# Patient Record
Sex: MALE | Race: WHITE | URBAN - METROPOLITAN AREA
[De-identification: names, ages, dates, MRNs, and addresses within clinical notes are randomized per-mention and may not be internally consistent; named-entity substitution may affect disease eponyms.]

---

## 2021-07-09 ENCOUNTER — APPOINTMENT (OUTPATIENT)
Dept: GENERAL RADIOLOGY | Age: 51
End: 2021-07-09
Payer: COMMERCIAL

## 2021-07-09 ENCOUNTER — HOSPITAL ENCOUNTER (EMERGENCY)
Age: 51
Discharge: HOME | End: 2021-07-10
Attending: EMERGENCY MEDICINE
Payer: COMMERCIAL

## 2021-07-09 VITALS
RESPIRATION RATE: 16 BRPM | HEART RATE: 72 BPM | WEIGHT: 183.2 LBS | HEIGHT: 69 IN | TEMPERATURE: 98.7 F | BODY MASS INDEX: 27.13 KG/M2 | SYSTOLIC BLOOD PRESSURE: 139 MMHG | DIASTOLIC BLOOD PRESSURE: 89 MMHG | OXYGEN SATURATION: 99 %

## 2021-07-09 DIAGNOSIS — S61.313A LACERATION OF LEFT MIDDLE FINGER W/O FOREIGN BODY WITH DAMAGE TO NAIL, INITIAL ENCOUNTER: Primary | ICD-10-CM

## 2021-07-09 PROCEDURE — 6360000002 HC RX W HCPCS: Performed by: EMERGENCY MEDICINE

## 2021-07-09 PROCEDURE — 2500000003 HC RX 250 WO HCPCS: Performed by: EMERGENCY MEDICINE

## 2021-07-09 PROCEDURE — 90715 TDAP VACCINE 7 YRS/> IM: CPT | Performed by: EMERGENCY MEDICINE

## 2021-07-09 PROCEDURE — 12041 INTMD RPR N-HF/GENIT 2.5CM/<: CPT

## 2021-07-09 PROCEDURE — 73130 X-RAY EXAM OF HAND: CPT

## 2021-07-09 PROCEDURE — 99283 EMERGENCY DEPT VISIT LOW MDM: CPT

## 2021-07-09 PROCEDURE — 90471 IMMUNIZATION ADMIN: CPT | Performed by: EMERGENCY MEDICINE

## 2021-07-09 PROCEDURE — 6370000000 HC RX 637 (ALT 250 FOR IP): Performed by: EMERGENCY MEDICINE

## 2021-07-09 RX ORDER — CEPHALEXIN 500 MG/1
500 CAPSULE ORAL 4 TIMES DAILY
Qty: 20 CAPSULE | Refills: 0 | Status: SHIPPED | OUTPATIENT
Start: 2021-07-09 | End: 2021-07-14

## 2021-07-09 RX ORDER — CEPHALEXIN 500 MG/1
500 CAPSULE ORAL ONCE
Status: COMPLETED | OUTPATIENT
Start: 2021-07-09 | End: 2021-07-09

## 2021-07-09 RX ORDER — BUPIVACAINE HYDROCHLORIDE 5 MG/ML
30 INJECTION, SOLUTION EPIDURAL; INTRACAUDAL ONCE
Status: COMPLETED | OUTPATIENT
Start: 2021-07-09 | End: 2021-07-09

## 2021-07-09 RX ADMIN — TETANUS TOXOID, REDUCED DIPHTHERIA TOXOID AND ACELLULAR PERTUSSIS VACCINE, ADSORBED 0.5 ML: 5; 2.5; 8; 8; 2.5 SUSPENSION INTRAMUSCULAR at 23:45

## 2021-07-09 RX ADMIN — CEPHALEXIN 500 MG: 500 CAPSULE ORAL at 23:41

## 2021-07-09 RX ADMIN — BUPIVACAINE HYDROCHLORIDE 150 MG: 5 INJECTION, SOLUTION EPIDURAL; INTRACAUDAL; PERINEURAL at 23:30

## 2021-07-09 ASSESSMENT — PAIN DESCRIPTION - LOCATION
LOCATION: FINGER (COMMENT WHICH ONE)
LOCATION: FINGER (COMMENT WHICH ONE)

## 2021-07-09 ASSESSMENT — PAIN DESCRIPTION - ORIENTATION
ORIENTATION: LEFT
ORIENTATION: LEFT

## 2021-07-09 ASSESSMENT — PAIN DESCRIPTION - ONSET
ONSET: ON-GOING
ONSET: SUDDEN

## 2021-07-09 ASSESSMENT — PAIN SCALES - GENERAL
PAINLEVEL_OUTOF10: 10
PAINLEVEL_OUTOF10: 8
PAINLEVEL_OUTOF10: 8

## 2021-07-09 ASSESSMENT — PAIN - FUNCTIONAL ASSESSMENT
PAIN_FUNCTIONAL_ASSESSMENT: PREVENTS OR INTERFERES SOME ACTIVE ACTIVITIES AND ADLS
PAIN_FUNCTIONAL_ASSESSMENT: PREVENTS OR INTERFERES SOME ACTIVE ACTIVITIES AND ADLS

## 2021-07-09 ASSESSMENT — PAIN DESCRIPTION - DESCRIPTORS
DESCRIPTORS: THROBBING
DESCRIPTORS: THROBBING

## 2021-07-09 ASSESSMENT — PAIN DESCRIPTION - PAIN TYPE
TYPE: ACUTE PAIN
TYPE: ACUTE PAIN

## 2021-07-09 ASSESSMENT — PAIN DESCRIPTION - PROGRESSION: CLINICAL_PROGRESSION: NOT CHANGED

## 2021-07-10 NOTE — ED PROVIDER NOTES
[facility] ENCOUNTER      Pt Name: Matilde Gruber  MRN: [id number]  [place] 1970  Date of evaluation: 7/9/2021  Provider: Naomy Pollard DO    CHIEF COMPLAINT       Chief Complaint   Patient presents with    Hand Laceration     Pt injured LIF while cutting grass. Pt has lost  3/4 ths of his nail and has 2 to 3 mm lacerations on either side of  his nail bed. Bleeding is controlled at present. Pt is able to bend finger at all joints. HISTORY OF PRESENT ILLNESS   (Location/Symptom, Timing/Onset, Context/Setting, Quality, Duration, Modifying Factors, Severity)  Note limiting factors. Matilde Gruber is a 48 y.o. male who presents to the emergency department with a complaint of an injury to the left index finger that he sustained when he was cutting grass. He reports that he left the mower running while he was adjusting the wheel and reached under the mower to hold onto it and the tip of the blade cut the tip of his left index finger and pulled off the nail. He denies any other injury. He is not diabetic. He does not take any anticoagulants. Injury occurred prior to arrival.    Nursing Notes were reviewed. HPI        REVIEW OF SYSTEMS    (2-9 systems for level 4, 10 or more for level 5)       Constitutional: Negative for fever or chills. Respiratory: Negative for shortness of breath or dyspnea on exertion. Cardiovascular: Negative for chest pain. Gastrointestinal: Negative for abdominal pain. Negative for vomiting or diarrhea. Genitourinary: Negative for flank pain. Negative for dysuria. Negative for hematuria. Neurological: Negative for headache. All systems are reviewed and are negative except for those listed above in the history of present illness and ROS. PAST MEDICAL HISTORY     Past Medical History:   Diagnosis Date    ADHD     Hyperlipidemia          SURGICAL HISTORY     History reviewed.  No pertinent surgical history. CURRENT MEDICATIONS       Previous Medications    AMPHETAMINE-DEXTROAMPHETAMINE (ADDERALL, 20MG,) 20 MG TABLET    Take 20 mg by mouth daily    OMEGA 3-6-9 FATTY ACIDS (OMEGA 3-6-9 COMPLEX PO)    Take by mouth    TADALAFIL (CIALIS) 5 MG TABLET    Take 5 mg by mouth as needed for Erectile Dysfunction       ALLERGIES     Patient has no known allergies. FAMILY HISTORY     History reviewed. No pertinent family history. SOCIAL HISTORY       Social History     Socioeconomic History    Marital status: Single     Spouse name: None    Number of children: None    Years of education: None    Highest education level: None   Occupational History    None   Tobacco Use    Smoking status: Light Tobacco Smoker    Smokeless tobacco: Never Used   Substance and Sexual Activity    Alcohol use: Yes     Comment: socially    Drug use: Yes     Types: Marijuana    Sexual activity: None   Other Topics Concern    None   Social History Narrative    None     Social Determinants of Health     Financial Resource Strain:     Difficulty of Paying Living Expenses:    Food Insecurity:     Worried About Running Out of Food in the Last Year:     Ran Out of Food in the Last Year:    Transportation Needs:     Lack of Transportation (Medical):      Lack of Transportation (Non-Medical):    Physical Activity:     Days of Exercise per Week:     Minutes of Exercise per Session:    Stress:     Feeling of Stress :    Social Connections:     Frequency of Communication with Friends and Family:     Frequency of Social Gatherings with Friends and Family:     Attends Ca[employer] Services:     Active Member of Clubs or Organizations:     Attends Club or Organization Meetings:     Marital Status:    Intimate Partner Violence:     Fear of Current or Ex-Partner:     Emotionally Abused:     Physically Abused:     Sexually Abused:        SCREENINGS             PHYSICAL EXAM    (up to 7 for level 4, 8 or more for level 5)     ED Triage Vitals [07/09/21 2130]   BP Temp Temp Source Pulse Resp SpO2 Height Weight   139/89 98.7 °F (37.1 °C) Oral 72 16 99 % 5' 9\" (1.753 m) 183 lb 3.2 oz (83.1 kg)         Physical Exam   Constitutional: Awake and alert. Mild discomfort. Head: No visible evidence of trauma. Normocephalic. Heart:  Regular rate and rhythm. Lungs: No conversational dyspnea or accessory muscle use. Musculoskeletal: Extremities non-tender with full range of motion. However, there was an injury to the tip of the left index finger. Approximately 75% of the nail was avulsed from the nail bed. There was a transverse laceration across the distal one third of the nailbed. No visible tuft exposed. No active bleeding. No foreign body. No deformity. The patient was able to flex and extend the finger against resistance at all joint levels. Sensation was intact to the tip of the finger. The nail envelope and proximal nail were well seated and appeared to be unaffected. Capillary refill less than 2 seconds to the tip of the finger. In addition there is a small abrasion to the tip of the left index finger as well as a small hemorrhagic vesicle to the tip of the left index finger and left fourth finger on the tip of the volar pad. All remaining nailbeds were intact. Capillary refill less than 2 seconds in all digits. No other bony tenderness to the hand. Radial and dorsalis pedis pulses were intact. No calf tenderness erythema or edema. Neurological: Alert and oriented x 3. Speech clear. DO median and ulnar nerve are fully intact. No facial droop. No acute focal motor or sensory deficits. Skin: Skin is warm and dry. No rash. Psychiatric: Normal mood and affect.  Behavior is normal.         DIAGNOSTIC RESULTS     EKG: All EKG's are interpreted by the Emergency Department Physician who either signs or Co-signs this chart in the absence of a cardiologist.        RADIOLOGY:   Non-plain film images such as CT, Ultrasound and MRI are read by the radiologist. Plain radiographic images are visualized and preliminarily interpreted by the emergency physician with the below findings:        Interpretation per the Radiologist below, if available at the time of this note:    XR HAND LEFT (MIN 3 VIEWS)    (Results Pending)         ED BEDSIDE ULTRASOUND:   Performed by ED Physician - none    LABS:  Labs Reviewed - No data to display    All other labs were within normal range or not returned as of this dictation. EMERGENCY DEPARTMENT COURSE and DIFFERENTIAL DIAGNOSIS/MDM:   Vitals:    Vitals:    07/09/21 2130   BP: 139/89   Pulse: 72   Resp: 16   Temp: 98.7 °F (37.1 °C)   TempSrc: Oral   SpO2: 99%   Weight: 183 lb 3.2 oz (83.1 kg)   Height: 5' 9\" (1.753 m)         MDM      Patient presents with a laceration/nailbed injury to the left middle finger as noted above. X-ray of the left hand was obtained. Tetanus immunization was updated. Wound care was provided. He was given Keflex 500 mg p.o. Digital block was completed of the left third finger and laceration was repaired. X-ray was reviewed. There does appear to be a small avulsion fracture to the tip of the distal phalanx of the left middle finger. There is a small adjacent tiny bony fragment. No other bony injury identified. REASSESSMENT          I advised him to follow-up with Dr. Raquel Alejandra, hand surgeon in 1 to 2 days for reexamination. Call tomorrow morning for an appointment to be seen on Monday. Leave dressing in place until seen by the hand surgeon on Monday. If unable to see the hand surgeon remove the dressing and clean with warm soapy water twice daily. Keep covered. Wear finger splint for protection. Recommend suture removal in 7 to 10 days. He will be given a prescription for Keflex. If condition worsens or new symptoms develop, return immediately to the emergency department.   Do not submerge the hand underwater, swim, etc. until cleared by the hand surgeon. CRITICAL CARE TIME   Total Critical Care time was 0 minutes, excluding separately reportable procedures. There was a high probability of clinically significant/life threatening deterioration in the patient's condition which required my urgent intervention. CONSULTS:  None    PROCEDURES:  Unless otherwise noted below, none     Procedures    Left third finger nailbed laceration repair:    Digital block was completed with 0.5% bupivacaine, 4 mL subcutaneously. After adequate anesthesia, wounds were cleansed with chlorhexidine and irrigated with normal saline. Wound was explored. No foreign body. No active bleeding. No visible tuft. There is a transverse 1.5 cm laceration to the distal one third of the nailbed. A single 5-0 nylon simple interrupted suture was placed on each side of the nailbed at the cuticle margin for stability. The nailbed laceration was approximated with a total of 3, 5-0 Vicryl sutures. It should be noted that the laceration of the nailbed was more of an avulsion and there was some missing tissue. This was approximated as best as possible. Patient was advised of the potential for future nail deformity, lack of growth of the nail, complete loss of the nail. Proximal nail was well seated in position and did not require removal.  Distal nail was avulsed, approximately 75% of the nail. Sterile dressing was applied. Finger splint was applied. FINAL IMPRESSION      1. Laceration of left middle finger w/o foreign body with damage to nail, initial encounter          DISPOSITION/PLAN   DISPOSITION        PATIENT REFERRED TO:  Chavez Wellington MD  [address] [place] [address]  [phone]    Call today        DISCHARGE MEDICATIONS:  New Prescriptions    CEPHALEXIN (KEFLEX) 500 MG CAPSULE    Take 1 capsule by mouth 4 times daily for 5 days     Controlled Substances Monitoring:     No flowsheet data found.     (Please note that portions of this note were completed with a voice recognition program.  Efforts were made to edit the dictations but occasionally words are mis-transcribed. )    Wade Fry DO (electronically signed)  Attending Emergency Physician          Jolie Severe, DO  07/09/21 1353

## 2021-07-12 ENCOUNTER — TELEPHONE (OUTPATIENT)
Dept: ORTHOPEDIC SURGERY | Age: 51
End: 2021-07-12

## 2021-07-12 NOTE — TELEPHONE ENCOUNTER
Appointment Request     Patient requesting earlier appointment: Yes  Appointment offered to patient: KO[place] ON 7/22 [address] @[facility] 7/9  Patient Contact Number: [phone]

## 2021-07-15 ENCOUNTER — OFFICE VISIT (OUTPATIENT)
Dept: ORTHOPEDIC SURGERY | Age: 51
End: 2021-07-15
Payer: COMMERCIAL

## 2021-07-15 VITALS — BODY MASS INDEX: 26.66 KG/M2 | HEIGHT: 69 IN | WEIGHT: 180 LBS | RESPIRATION RATE: 16 BRPM

## 2021-07-15 DIAGNOSIS — S62.663B OPEN NONDISPLACED FRACTURE OF DISTAL PHALANX OF LEFT MIDDLE FINGER, INITIAL ENCOUNTER: Primary | ICD-10-CM

## 2021-07-15 DIAGNOSIS — S61.319A NAILBED LACERATION, FINGER, INITIAL ENCOUNTER: ICD-10-CM

## 2021-07-15 PROCEDURE — 26750 TREAT FINGER FRACTURE EACH: CPT | Performed by: ORTHOPAEDIC SURGERY

## 2021-07-15 PROCEDURE — 99203 OFFICE O/P NEW LOW 30 MIN: CPT | Performed by: ORTHOPAEDIC SURGERY

## 2021-07-15 NOTE — PROGRESS NOTES
This 48 y.o. right hand dominant  is seen in referral for the ED at [place] [address] with a chief complaint of an injury to the left middle finger. The injury occurred 6 days ago when the patient came in contact with a mower blade. He noticed pain, bleeding. The patient was evaluated at the Clinic where the skin and nailbed wound was sutured. A lacerated tendon was not suspected. A lacerated nerve was not suspected. A foreign body was not found. Xrays showed a fracture of the distal phalanx. The finger was dressed, splinted, antibiotics were prescribed tetanus prophylaxis was updated. The patient was sent for hand surgery consultation. The pain assessment has been reviewed and is correct. The patient's social history, past medical history, family history, medications, allergies and review of systems, entered 7/15/21, have been reviewed, and dated and are recorded in the chart. On physical examination the patient is Height: 5' 9\" (175.3 cm) tall and weighs Weight: 180 lb (81.6 kg). Respirations are 18 per minute. The patient is well nourished, is oriented to time and place, demonstrates appropriate mood and affect as well as normal gait and station. There is a 2 cm trassverse laceration involving the dorsal aspect of the left middle finger across the mid portion of the nail bed. There is absent drainage. There is absent sign of infection. Range of motion of the middle finger is milgly limited from splinting. Distal sensation  is normal.  Distal circulation is intact. There is no deformity. All joints are stable. Deep tendon reflexes are present bilaterally. There is no cellulitis or lymphangitis. There is no proximal adenopathy. There is no sign of additional significant injury to the opposite upper extremity.     I have personally reviewed and interpreted all previous external imaging studies, laboratory tests, diagnostic procedures and medical encounters pertinent to this patient's visit today    Impression: Open fracture distal phalanx left middle finger. Laceration left middle finger, involving the nail bed. The nature of this medical problem is fully discussed with the patient, including all treatment options. All questions are answered. He is instructed about activity precautions, wound care and a range of motion exercise program, which is fully discussed and demonstrated. He is advised to finish the antibiotics as prescribed. The patient is given a return appointment for 1 week for suture removal and Xrays and is instructed to call sooner if there are any questions or problems.

## 2021-07-22 ENCOUNTER — OFFICE VISIT (OUTPATIENT)
Dept: ORTHOPEDIC SURGERY | Age: 51
End: 2021-07-22

## 2021-07-22 VITALS — RESPIRATION RATE: 16 BRPM | HEIGHT: 69 IN | WEIGHT: 180 LBS | BODY MASS INDEX: 26.66 KG/M2

## 2021-07-22 DIAGNOSIS — S61.319A NAILBED LACERATION, FINGER, INITIAL ENCOUNTER: ICD-10-CM

## 2021-07-22 DIAGNOSIS — S62.663B OPEN NONDISPLACED FRACTURE OF DISTAL PHALANX OF LEFT MIDDLE FINGER, INITIAL ENCOUNTER: Primary | ICD-10-CM

## 2021-07-22 PROCEDURE — 99024 POSTOP FOLLOW-UP VISIT: CPT | Performed by: ORTHOPAEDIC SURGERY

## 2025-08-31 ENCOUNTER — APPOINTMENT (OUTPATIENT)
Dept: GENERAL RADIOLOGY | Age: 55
End: 2025-08-31
Payer: COMMERCIAL

## 2025-08-31 ENCOUNTER — HOSPITAL ENCOUNTER (EMERGENCY)
Age: 55
Discharge: HOME | End: 2025-08-31
Attending: EMERGENCY MEDICINE
Payer: COMMERCIAL

## 2025-08-31 VITALS
HEIGHT: 68 IN | OXYGEN SATURATION: 100 % | BODY MASS INDEX: 27.87 KG/M2 | DIASTOLIC BLOOD PRESSURE: 105 MMHG | HEART RATE: 62 BPM | SYSTOLIC BLOOD PRESSURE: 151 MMHG | RESPIRATION RATE: 18 BRPM | TEMPERATURE: 98.1 F | WEIGHT: 183.86 LBS

## 2025-08-31 DIAGNOSIS — R07.9 CHEST PAIN, UNSPECIFIED TYPE: Primary | ICD-10-CM

## 2025-08-31 LAB
ALBUMIN SERPL-MCNC: 4.4 G/DL (ref 3.4–5)
ALBUMIN/GLOB SERPL: 2 {RATIO} (ref 1.1–2.2)
ALP SERPL-CCNC: 84 U/L (ref 40–129)
ALT SERPL-CCNC: 24 U/L (ref 10–40)
ANION GAP SERPL CALCULATED.3IONS-SCNC: 12 MMOL/L (ref 3–16)
AST SERPL-CCNC: 27 U/L (ref 15–37)
BASOPHILS # BLD: 0 K/UL (ref 0–0.2)
BASOPHILS NFR BLD: 0.2 %
BILIRUB SERPL-MCNC: 0.3 MG/DL (ref 0–1)
BUN SERPL-MCNC: 15 MG/DL (ref 7–20)
CALCIUM SERPL-MCNC: 9.6 MG/DL (ref 8.3–10.6)
CHLORIDE SERPL-SCNC: 104 MMOL/L (ref 99–110)
CO2 SERPL-SCNC: 24 MMOL/L (ref 21–32)
CREAT SERPL-MCNC: 1 MG/DL (ref 0.9–1.3)
DEPRECATED RDW RBC AUTO: 12.6 % (ref 12.4–15.4)
EOSINOPHIL # BLD: 0 K/UL (ref 0–0.6)
EOSINOPHIL NFR BLD: 0.6 %
GFR SERPLBLD CREATININE-BSD FMLA CKD-EPI: 89 ML/MIN/{1.73_M2}
GLUCOSE SERPL-MCNC: 95 MG/DL (ref 70–99)
HCT VFR BLD AUTO: 46.2 % (ref 40.5–52.5)
HGB BLD-MCNC: 16.3 G/DL (ref 13.5–17.5)
LYMPHOCYTES # BLD: 1.3 K/UL (ref 1–5.1)
LYMPHOCYTES NFR BLD: 20.1 %
MCH RBC QN AUTO: 31.7 PG (ref 26–34)
MCHC RBC AUTO-ENTMCNC: 35.2 G/DL (ref 31–36)
MCV RBC AUTO: 90.1 FL (ref 80–100)
MONOCYTES # BLD: 0.5 K/UL (ref 0–1.3)
MONOCYTES NFR BLD: 8.7 %
NEUTROPHILS # BLD: 4.4 K/UL (ref 1.7–7.7)
NEUTROPHILS NFR BLD: 70.4 %
PLATELET # BLD AUTO: 224 K/UL (ref 135–450)
PMV BLD AUTO: 8.3 FL (ref 5–10.5)
POTASSIUM SERPL-SCNC: 4.2 MMOL/L (ref 3.5–5.1)
PROT SERPL-MCNC: 6.6 G/DL (ref 6.4–8.2)
RBC # BLD AUTO: 5.13 M/UL (ref 4.2–5.9)
SODIUM SERPL-SCNC: 140 MMOL/L (ref 136–145)
TROPONIN, HIGH SENSITIVITY: 10 NG/L (ref 0–22)
TROPONIN, HIGH SENSITIVITY: 9 NG/L (ref 0–22)
WBC # BLD AUTO: 6.3 K/UL (ref 4–11)

## 2025-08-31 PROCEDURE — 36415 COLL VENOUS BLD VENIPUNCTURE: CPT

## 2025-08-31 PROCEDURE — 84484 ASSAY OF TROPONIN QUANT: CPT

## 2025-08-31 PROCEDURE — 6370000000 HC RX 637 (ALT 250 FOR IP): Performed by: EMERGENCY MEDICINE

## 2025-08-31 PROCEDURE — 93005 ELECTROCARDIOGRAM TRACING: CPT

## 2025-08-31 PROCEDURE — 80053 COMPREHEN METABOLIC PANEL: CPT

## 2025-08-31 PROCEDURE — 71046 X-RAY EXAM CHEST 2 VIEWS: CPT

## 2025-08-31 PROCEDURE — 85025 COMPLETE CBC W/AUTO DIFF WBC: CPT

## 2025-08-31 PROCEDURE — 99285 EMERGENCY DEPT VISIT HI MDM: CPT

## 2025-08-31 RX ORDER — BACLOFEN 10 MG/1
10 TABLET ORAL 3 TIMES DAILY
Qty: 30 TABLET | Refills: 0 | Status: SHIPPED | OUTPATIENT
Start: 2025-08-31 | End: 2025-09-10

## 2025-08-31 RX ORDER — BACLOFEN 10 MG/1
10 TABLET ORAL ONCE
Status: COMPLETED | OUTPATIENT
Start: 2025-08-31 | End: 2025-08-31

## 2025-08-31 RX ADMIN — BACLOFEN 10 MG: 10 TABLET ORAL at 20:20

## 2025-08-31 RX ADMIN — ALUMINUM HYDROXIDE, MAGNESIUM HYDROXIDE, AND SIMETHICONE: 200; 20; 200 SUSPENSION ORAL at 19:58

## 2025-08-31 ASSESSMENT — LIFESTYLE VARIABLES
HOW OFTEN DO YOU HAVE A DRINK CONTAINING ALCOHOL: MONTHLY OR LESS
HOW MANY STANDARD DRINKS CONTAINING ALCOHOL DO YOU HAVE ON A TYPICAL DAY: 1 OR 2

## 2025-08-31 ASSESSMENT — PAIN - FUNCTIONAL ASSESSMENT
PAIN_FUNCTIONAL_ASSESSMENT: 0-10
PAIN_FUNCTIONAL_ASSESSMENT: 0-10

## 2025-08-31 ASSESSMENT — PAIN SCALES - GENERAL
PAINLEVEL_OUTOF10: 1
PAINLEVEL_OUTOF10: 0
PAINLEVEL_OUTOF10: 0

## 2025-08-31 ASSESSMENT — HEART SCORE: ECG: NORMAL

## 2025-09-01 LAB
EKG ATRIAL RATE: 59 BPM
EKG DIAGNOSIS: NORMAL
EKG P AXIS: 71 DEGREES
EKG P-R INTERVAL: 170 MS
EKG Q-T INTERVAL: 410 MS
EKG QRS DURATION: 96 MS
EKG QTC CALCULATION (BAZETT): 405 MS
EKG R AXIS: 74 DEGREES
EKG T AXIS: 59 DEGREES
EKG VENTRICULAR RATE: 59 BPM

## 2025-09-01 PROCEDURE — 93010 ELECTROCARDIOGRAM REPORT: CPT | Performed by: INTERNAL MEDICINE

## 2025-09-02 ENCOUNTER — TELEPHONE (OUTPATIENT)
Dept: CARDIOLOGY CLINIC | Age: 55
End: 2025-09-02